# Patient Record
Sex: FEMALE | Race: WHITE | Employment: OTHER | RURAL
[De-identification: names, ages, dates, MRNs, and addresses within clinical notes are randomized per-mention and may not be internally consistent; named-entity substitution may affect disease eponyms.]

---

## 2018-04-16 ENCOUNTER — OFFICE VISIT (OUTPATIENT)
Dept: SURGERY | Age: 58
End: 2018-04-16
Payer: COMMERCIAL

## 2018-04-16 ENCOUNTER — SURG/PROC ORDERS (OUTPATIENT)
Dept: SURGERY | Age: 58
End: 2018-04-16

## 2018-04-16 VITALS
BODY MASS INDEX: 29.28 KG/M2 | HEIGHT: 64 IN | WEIGHT: 171.5 LBS | RESPIRATION RATE: 18 BRPM | TEMPERATURE: 97.8 F | DIASTOLIC BLOOD PRESSURE: 89 MMHG | HEART RATE: 62 BPM | SYSTOLIC BLOOD PRESSURE: 137 MMHG

## 2018-04-16 DIAGNOSIS — K59.00 CONSTIPATION, UNSPECIFIED CONSTIPATION TYPE: ICD-10-CM

## 2018-04-16 DIAGNOSIS — Z86.010 HISTORY OF ADENOMATOUS POLYP OF COLON: ICD-10-CM

## 2018-04-16 DIAGNOSIS — Z12.11 COLON CANCER SCREENING: Primary | ICD-10-CM

## 2018-04-16 PROCEDURE — 99244 OFF/OP CNSLTJ NEW/EST MOD 40: CPT | Performed by: SURGERY

## 2018-04-16 RX ORDER — SODIUM CHLORIDE 0.9 % (FLUSH) 0.9 %
10 SYRINGE (ML) INJECTION PRN
Status: CANCELLED | OUTPATIENT
Start: 2018-04-16

## 2018-04-16 RX ORDER — CARVEDILOL 12.5 MG/1
12.5 TABLET ORAL 2 TIMES DAILY
COMMUNITY

## 2018-04-16 RX ORDER — SODIUM CHLORIDE 0.9 % (FLUSH) 0.9 %
10 SYRINGE (ML) INJECTION EVERY 12 HOURS SCHEDULED
Status: CANCELLED | OUTPATIENT
Start: 2018-04-16

## 2018-04-16 RX ORDER — SIMVASTATIN 20 MG
20 TABLET ORAL NIGHTLY
COMMUNITY
End: 2019-06-04

## 2018-04-16 RX ORDER — SODIUM CHLORIDE, SODIUM LACTATE, POTASSIUM CHLORIDE, CALCIUM CHLORIDE 600; 310; 30; 20 MG/100ML; MG/100ML; MG/100ML; MG/100ML
INJECTION, SOLUTION INTRAVENOUS CONTINUOUS
Status: CANCELLED | OUTPATIENT
Start: 2018-04-16

## 2018-04-16 RX ORDER — HYDROCHLOROTHIAZIDE 25 MG/1
25 TABLET ORAL DAILY
COMMUNITY

## 2018-04-16 ASSESSMENT — ENCOUNTER SYMPTOMS
GASTROINTESTINAL NEGATIVE: 1
RESPIRATORY NEGATIVE: 1
EYES NEGATIVE: 1

## 2018-05-16 ENCOUNTER — HOSPITAL ENCOUNTER (OUTPATIENT)
Dept: OTHER | Age: 58
Discharge: HOME | End: 2018-05-16
Attending: SURGERY | Admitting: SURGERY

## 2018-05-16 ENCOUNTER — OFFICE VISIT (OUTPATIENT)
Dept: SURGERY | Age: 58
End: 2018-05-16
Payer: COMMERCIAL

## 2018-05-16 VITALS
HEART RATE: 65 BPM | RESPIRATION RATE: 16 BRPM | HEIGHT: 64 IN | BODY MASS INDEX: 28.53 KG/M2 | WEIGHT: 167.1 LBS | TEMPERATURE: 98.3 F | SYSTOLIC BLOOD PRESSURE: 140 MMHG | DIASTOLIC BLOOD PRESSURE: 90 MMHG

## 2018-05-16 DIAGNOSIS — D12.4 ADENOMATOUS POLYP OF DESCENDING COLON: Primary | ICD-10-CM

## 2018-05-16 DIAGNOSIS — K64.8 INTERNAL HEMORRHOIDS WITHOUT COMPLICATION: ICD-10-CM

## 2018-05-16 PROCEDURE — 99212 OFFICE O/P EST SF 10 MIN: CPT | Performed by: SURGERY

## 2019-06-04 ENCOUNTER — HOSPITAL ENCOUNTER (EMERGENCY)
Facility: HOSPITAL | Age: 59
Discharge: NURSING, REHAB OR LONG-TERM CARE | End: 2019-06-04
Attending: EMERGENCY MEDICINE
Payer: COMMERCIAL

## 2019-06-04 VITALS
HEART RATE: 67 BPM | TEMPERATURE: 98.2 F | HEIGHT: 65 IN | OXYGEN SATURATION: 98 % | RESPIRATION RATE: 20 BRPM | SYSTOLIC BLOOD PRESSURE: 129 MMHG | BODY MASS INDEX: 26.66 KG/M2 | WEIGHT: 160 LBS | DIASTOLIC BLOOD PRESSURE: 87 MMHG

## 2019-06-04 DIAGNOSIS — S65.311A LACERATION OF DEEP PALMAR ARCH OF RIGHT HAND, INITIAL ENCOUNTER: Primary | ICD-10-CM

## 2019-06-04 PROCEDURE — 12002 RPR S/N/AX/GEN/TRNK2.6-7.5CM: CPT

## 2019-06-04 PROCEDURE — 99283 EMERGENCY DEPT VISIT LOW MDM: CPT

## 2019-06-04 PROCEDURE — 90715 TDAP VACCINE 7 YRS/> IM: CPT

## 2019-06-04 PROCEDURE — 6360000002 HC RX W HCPCS

## 2019-06-04 PROCEDURE — 90471 IMMUNIZATION ADMIN: CPT

## 2019-06-04 RX ORDER — CELECOXIB 100 MG/1
100 CAPSULE ORAL 2 TIMES DAILY
COMMUNITY

## 2019-06-04 RX ORDER — BACLOFEN 10 MG/1
10 TABLET ORAL DAILY
COMMUNITY

## 2019-06-04 RX ADMIN — TETANUS TOXOID, REDUCED DIPHTHERIA TOXOID AND ACELLULAR PERTUSSIS VACCINE, ADSORBED 0.5 ML: 5; 2.5; 8; 8; 2.5 SUSPENSION INTRAMUSCULAR at 10:09

## 2019-06-04 ASSESSMENT — ENCOUNTER SYMPTOMS
DIARRHEA: 0
TROUBLE SWALLOWING: 0
SINUS PRESSURE: 0
ABDOMINAL PAIN: 0
COUGH: 0
CHEST TIGHTNESS: 0
EYE REDNESS: 0
BACK PAIN: 0
VOMITING: 0
EYE DISCHARGE: 0
WHEEZING: 0
SORE THROAT: 0
SHORTNESS OF BREATH: 0
RHINORRHEA: 0
EYE PAIN: 0
NAUSEA: 0
CONSTIPATION: 0

## 2019-06-04 ASSESSMENT — PAIN SCALES - GENERAL: PAINLEVEL_OUTOF10: 9

## 2019-06-04 ASSESSMENT — PAIN DESCRIPTION - DESCRIPTORS: DESCRIPTORS: BURNING

## 2019-06-04 ASSESSMENT — PAIN DESCRIPTION - ORIENTATION: ORIENTATION: RIGHT

## 2019-06-04 ASSESSMENT — PAIN DESCRIPTION - PAIN TYPE: TYPE: ACUTE PAIN

## 2019-06-04 ASSESSMENT — PAIN DESCRIPTION - LOCATION: LOCATION: HAND

## 2019-06-04 ASSESSMENT — PAIN DESCRIPTION - FREQUENCY: FREQUENCY: CONTINUOUS

## 2019-06-04 NOTE — ED NOTES
Patient left pov via  Rachel Loose driving at this time, instructions to be npo verbalized per patient and patient .      Saniya Alas RN  06/04/19 1353

## 2019-06-04 NOTE — ED PROVIDER NOTES
APPENDECTOMY      CHOLECYSTECTOMY      COLONOSCOPY  2011    [facility]    HYSTERECTOMY      HYSTERECTOMY, VAGINAL      NECK SURGERY  [id number]    2 ganglion cyst    WRIST GANGLION EXCISION Left          CURRENT MEDICATIONS       Previous Medications    ASPIRIN 81 MG TABLET    Take 81 mg by mouth daily    BACLOFEN (LIORESAL) 10 MG TABLET    Take 10 mg by mouth daily    CARVEDILOL (COREG) 12.5 MG TABLET    Take 12.5 mg by mouth 2 times daily    CELECOXIB (CELEBREX) 100 MG CAPSULE    Take 100 mg by mouth 2 times daily    HYDROCHLOROTHIAZIDE (HYDRODIURIL) 25 MG TABLET    Take 25 mg by mouth daily       ALLERGIES     Patient has no known allergies.     FAMILY HISTORY       Family History   Problem Relation Age of Onset    Other Mother         thyroid    Kidney Disease Mother     Coronary Art Dis Father     Heart Surgery Father     Diabetes Father     Kidney Disease Father     Coronary Art Dis Brother           SOCIAL HISTORY       Social History     Socioeconomic History    Marital status:      Spouse name: None    Number of children: None    Years of education: None    Highest education level: None   Occupational History    None   Social Needs    Financial resource strain: None    Food insecurity:     Worry: None     Inability: None    Transportation needs:     Medical: None     Non-medical: None   Tobacco Use    Smoking status: Current Every Day Smoker     Packs/day: 1.00     Years: 26.00     Pack years: 26.00     Types: Cigarettes    Smokeless tobacco: Current User   Substance and Sexual Activity    Alcohol use: Yes     Comment: occassionally    Drug use: No    Sexual activity: None   Lifestyle    Physical activity:     Days per week: None     Minutes per session: None    Stress: None   Relationships    Social connections:     Talks on phone: None     Gets together: None     Attends [employer] service: None     Active member of club or organization: None     Attends meetings of clubs or DIAGNOSIS/MDM:   Vitals:    Vitals:    06/04/19 1005   BP: 110/64   Pulse: 60   Resp: 20   Temp: 98.2 °F (36.8 °C)   TempSrc: Oral   SpO2: 98%   Weight: 160 lb (72.6 kg)   Height: 5' 5\" (1.651 m)           CRITICAL CARE TIME   Total Critical Care time was  minutes, excluding separatelyreportable procedures. There was a high probability ofclinically significant/life threatening deterioration in the patient's condition which required my urgent intervention. CONSULTS:  None    PROCEDURES:  None    PROGRESS NOTES:    Called and discussed patient with Dr Elijah Armas at [facility] and he's accepted patient for transfer. FINAL IMPRESSION      1. Laceration of deep palmar arch of right hand, initial encounter          Final diagnoses:   Laceration of deep palmar arch of right hand, initial encounter       DISPOSITION/PLAN   DISPOSITION Decision To Transfer 06/04/2019 10:05:10 AM      PATIENT REFERRED TO:  No follow-up provider specified.     DISCHARGE MEDICATIONS:  New Prescriptions    No medications on file         (Please note thatportions of this note may have been completed with a voice recognition program.  Efforts were [facility] edit the dictations but occasionally words are mis-transcribed.)    Jay Gandhi MD (electronically signed)  Attending Emergency Physician        Casper Dahl MD  06/04/19 1010

## 2019-06-04 NOTE — ED NOTES
Patients right hand removed from soak and dried at this time, then wet to dry dressing intact at this time, secured with tape.      Kiersten Garcia RN  06/04/19 1016

## 2019-06-04 NOTE — ED NOTES
Patients right hand cleaned at this time per Perla Valero rn.      Yetta Sandifer, RN  06/04/19 1012

## 2019-06-04 NOTE — ED NOTES
Pt report called to Alicja Dodd at [employer].       Van Molina RN  06/04/19 101

## 2019-06-04 NOTE — ED NOTES
Patient was holding a log, her  was cutting the log and the chain saw kicked back cutting her right hand about 45 mins ago.      Marce Melendez RN  06/04/19 3386

## 2020-07-30 ENCOUNTER — HOSPITAL ENCOUNTER (OUTPATIENT)
Dept: MRI IMAGING | Facility: HOSPITAL | Age: 60
Discharge: HOME | End: 2020-07-30
Payer: MEDICAID

## 2020-07-30 PROCEDURE — 72148 MRI LUMBAR SPINE W/O DYE: CPT

## 2020-11-13 ENCOUNTER — HOSPITAL ENCOUNTER (OUTPATIENT)
Dept: NEUROLOGY | Facility: HOSPITAL | Age: 60
Discharge: HOME | End: 2020-11-13
Payer: MEDICAID

## 2020-11-13 PROCEDURE — 95908 NRV CNDJ TST 3-4 STUDIES: CPT

## 2020-11-13 PROCEDURE — 95886 MUSC TEST DONE W/N TEST COMP: CPT

## 2021-04-14 ENCOUNTER — TELEPHONE (OUTPATIENT)
Dept: OTHER | Facility: HOSPITAL | Age: 61
End: 2021-04-14

## 2021-04-14 NOTE — TELEPHONE ENCOUNTER
Pt is on a 3 year recall list for a colonoscopy with Dr. Juan Ching. I contacted pt to inform her that Dr. Juan Ching has retired and offered to send her records to [facility] to have the procedure set up with Dr. Isaias Odom. Pt agreed to see Dr. Isaias Odom.

## 2021-04-16 ENCOUNTER — TELEPHONE (OUTPATIENT)
Dept: SURGERY | Facility: CLINIC | Age: 61
End: 2021-04-16

## 2022-03-04 ENCOUNTER — HOSPITAL ENCOUNTER (OUTPATIENT)
Dept: CT IMAGING | Facility: HOSPITAL | Age: 62
Discharge: HOME | End: 2022-03-04
Payer: MEDICAID

## 2022-03-04 DIAGNOSIS — R51.9 NONINTRACTABLE HEADACHE, UNSPECIFIED CHRONICITY PATTERN, UNSPECIFIED HEADACHE TYPE: ICD-10-CM

## 2022-03-04 PROCEDURE — 70450 CT HEAD/BRAIN W/O DYE: CPT

## 2022-06-22 ENCOUNTER — HOSPITAL ENCOUNTER (OUTPATIENT)
Dept: ULTRASOUND IMAGING | Facility: HOSPITAL | Age: 62
Discharge: HOME | End: 2022-06-22
Payer: MEDICAID

## 2022-06-22 DIAGNOSIS — N64.4 BREAST PAIN: ICD-10-CM

## 2022-06-22 PROCEDURE — 76642 ULTRASOUND BREAST LIMITED: CPT

## 2022-09-02 NOTE — PROGRESS NOTES
Patient: Erendira Cabrera    YOB: 1960    Date: 09/07/2022    Primary Care Provider: Kasie Ulloa PA    Chief Complaint   Patient presents with   • Pain     Right breast       Subjective .     History of present illness:  I saw the patient in the office  today for evaluation and treatment of pain in right breast for 5 months. She denies nipple discharge. Ultrasound was done that showed a painful palpable lesion at 9-10 o'clock.  Ultrasound today indicates no change in the very small cyst that measured 5 mm in the right breast.  No other finding.  Mammogram also unremarkable.    The following portions of the patient's history were reviewed and updated as appropriate: allergies, current medications, past family history, past medical history, past social history, past surgical history and problem list.    Review of Systems   Constitutional: Negative for chills, fever and unexpected weight change.   HENT: Negative for hearing loss, trouble swallowing and voice change.    Eyes: Negative for visual disturbance.   Respiratory: Negative for apnea, cough, chest tightness, shortness of breath and wheezing.    Cardiovascular: Negative for chest pain, palpitations and leg swelling.   Gastrointestinal: Negative for abdominal distention, abdominal pain, anal bleeding, blood in stool, constipation, diarrhea, nausea, rectal pain and vomiting.   Endocrine: Negative for cold intolerance and heat intolerance.   Genitourinary: Negative for difficulty urinating, dysuria and flank pain.   Musculoskeletal: Negative for back pain and gait problem.   Skin: Negative for color change, rash and wound.   Neurological: Negative for dizziness, syncope, speech difficulty, weakness, light-headedness, numbness and headaches.   Hematological: Negative for adenopathy. Does not bruise/bleed easily.   Psychiatric/Behavioral: Negative for confusion. The patient is not nervous/anxious.        History:  Past Medical History:   Diagnosis  "Date   • Arthritis    • Hyperlipidemia    • Hypertension           Past Surgical History:   Procedure Laterality Date   • CERVICAL FUSION  2009    ACDF - C4-5, C6-7  Dr. Zuleta @     • CHOLECYSTECTOMY     • HYSTERECTOMY         Family History   Problem Relation Age of Onset   • Cancer Mother    • Hypertension Mother    • Diabetes Father        Social History     Tobacco Use   • Smoking status: Current Every Day Smoker     Types: Cigarettes   • Smokeless tobacco: Never Used   Substance Use Topics   • Alcohol use: No   • Drug use: No       Allergies:  No Known Allergies    Medications:     Current Outpatient Medications:   •  baclofen (LIORESAL) 20 MG tablet, , Disp: , Rfl:   •  carvedilol (COREG) 12.5 MG tablet, take 1 tablet by mouth twice a day, Disp: , Rfl: 0  •  hydrochlorothiazide (HYDRODIURIL) 25 MG tablet, Take 25 mg by mouth daily., Disp: , Rfl:   •  HYDROcodone-acetaminophen (NORCO) 7.5-325 MG per tablet, , Disp: , Rfl:   •  naproxen (NAPROSYN) 500 MG tablet, take 1 tablet by mouth twice a day with food, Disp: , Rfl: 0  •  simvastatin (ZOCOR) 20 MG tablet, take 1 tablet by mouth once daily, Disp: , Rfl: 0  •  aspirin 81 MG tablet, Take 81 mg by mouth daily., Disp: , Rfl:   •  gabapentin (NEURONTIN) 600 MG tablet, Take 1 tablet by mouth 3 (three) times a day., Disp: 90 tablet, Rfl: 5    Objective     Vital Signs:   Vitals:    09/07/22 0908   BP: 132/68   Pulse: 59   Resp: 18   Temp: 98.4 °F (36.9 °C)   TempSrc: Temporal   SpO2: 98%   Weight: 77.6 kg (171 lb)   Height: 162.6 cm (64\")       Physical Exam:     General Appearance:    Alert, cooperative, in no acute distress   Head:    Normocephalic, without obvious abnormality, atraumatic   Eyes:            Lids and lashes normal, conjunctivae and sclerae normal, no   icterus, no pallor, corneas clear,   Ears:    Ears appear intact with no abnormalities noted   Throat:   No oral lesions, no thrush, oral mucosa moist   Breast:    Right breast is tender to " palpation.  No definite mass palpated   Lungs:     Clear to auscultation,respirations regular, even and                  Unlabored    Heart:    Regular rhythm and normal rate, no murmur, no gallop.   Chest Wall:    No abnormalities observed   Abdomen:     Normal bowel sounds, no masses, no organomegaly, soft        non-tender, non-distended, no guarding.   Extremities:   Moves all extremities well, no edema, no cyanosis, no             redness   Pulses:   Pulses palpable and equal bilaterally   Skin:   No bleeding, bruising or rash   Lymph nodes:   No palpable adenopathy   Neurologic:   Cranial nerves 2 - 12 grossly intact.           Results Review:   I reviewed the patient's new clinical results.    Review of Systems was reviewed and confirmed as accurate as documented by the MA.    Assessment / Plan:    1. Breast pain        I did have a detailed and extensive discussion with the patient in the office today and reviewed her recent workup.  I have told the patient that ultraindicated very small cyst is incidental.  Do not believe this is causing all of her pain.  Recommend evaluation by her family doctor to evaluate her rib cage and chest as well as right shoulder.  Continue yearly mammograms and follow-up as needed.    Electronically signed by Hossein Milner MD  09/07/22  09:47 EDT

## 2022-09-07 ENCOUNTER — OFFICE VISIT (OUTPATIENT)
Dept: SURGERY | Facility: CLINIC | Age: 62
End: 2022-09-07

## 2022-09-07 VITALS
OXYGEN SATURATION: 98 % | BODY MASS INDEX: 29.19 KG/M2 | HEIGHT: 64 IN | RESPIRATION RATE: 18 BRPM | HEART RATE: 59 BPM | SYSTOLIC BLOOD PRESSURE: 132 MMHG | DIASTOLIC BLOOD PRESSURE: 68 MMHG | WEIGHT: 171 LBS | TEMPERATURE: 98.4 F

## 2022-09-07 DIAGNOSIS — N64.4 BREAST PAIN: Primary | ICD-10-CM

## 2022-09-07 PROCEDURE — 99203 OFFICE O/P NEW LOW 30 MIN: CPT | Performed by: SURGERY

## 2022-09-07 RX ORDER — BACLOFEN 20 MG/1
TABLET ORAL
COMMUNITY
Start: 2022-09-06

## 2022-09-07 RX ORDER — HYDROCODONE BITARTRATE AND ACETAMINOPHEN 7.5; 325 MG/1; MG/1
TABLET ORAL
COMMUNITY
Start: 2022-06-29

## 2022-09-13 ENCOUNTER — TELEPHONE (OUTPATIENT)
Dept: SURGERY | Facility: CLINIC | Age: 62
End: 2022-09-13

## 2022-09-13 NOTE — TELEPHONE ENCOUNTER
Caller: ARASH LOTT/ [employer]  [phone]     What form or medical record are you requesting: PROG NOTE AND U/S FROM 09/07/22    Who is requesting this form or medical record from you: [employer]    How would you like to receive the form or medical records (pick-up, mail, fax): FAX  If fax, what is the fax number: [phone]

## 2022-10-17 ENCOUNTER — HOSPITAL ENCOUNTER (OUTPATIENT)
Dept: CT IMAGING | Facility: HOSPITAL | Age: 62
Discharge: HOME | End: 2022-10-17
Payer: MEDICAID

## 2022-10-17 DIAGNOSIS — R07.89 OTHER CHEST PAIN: ICD-10-CM

## 2022-10-17 DIAGNOSIS — R11.0 NAUSEA: ICD-10-CM

## 2022-10-17 PROCEDURE — 6360000004 HC RX CONTRAST MEDICATION: Performed by: PHYSICIAN ASSISTANT

## 2022-10-17 PROCEDURE — 71270 CT THORAX DX C-/C+: CPT

## 2022-10-17 PROCEDURE — 74170 CT ABD WO CNTRST FLWD CNTRST: CPT

## 2022-10-17 RX ADMIN — IOPAMIDOL 100 ML: 755 INJECTION, SOLUTION INTRAVENOUS at 10:07

## 2022-10-20 ENCOUNTER — TELEPHONE (OUTPATIENT)
Dept: UROLOGY | Facility: CLINIC | Age: 62
End: 2022-10-20

## 2022-10-20 NOTE — TELEPHONE ENCOUNTER
Caller: Erendira Cabrera    Relationship: Self    Best call back number: [phone]    Who are you requesting to speak with (clinical staff, provider,  specific staff member):      Do you know the name of the person who called: RABIA    What was the call regarding: SCHEDULING URGENT REFERRAL    Do you require a callback: YES

## 2022-10-24 ENCOUNTER — PATIENT ROUNDING (BHMG ONLY) (OUTPATIENT)
Dept: UROLOGY | Facility: CLINIC | Age: 62
End: 2022-10-24

## 2022-10-24 ENCOUNTER — OFFICE VISIT (OUTPATIENT)
Dept: UROLOGY | Facility: CLINIC | Age: 62
End: 2022-10-24

## 2022-10-24 VITALS
HEIGHT: 64 IN | DIASTOLIC BLOOD PRESSURE: 82 MMHG | SYSTOLIC BLOOD PRESSURE: 134 MMHG | OXYGEN SATURATION: 97 % | WEIGHT: 175 LBS | TEMPERATURE: 98.2 F | HEART RATE: 76 BPM | BODY MASS INDEX: 29.88 KG/M2

## 2022-10-24 DIAGNOSIS — N32.81 OVERACTIVE BLADDER: Primary | ICD-10-CM

## 2022-10-24 DIAGNOSIS — N28.89 RENAL MASS: ICD-10-CM

## 2022-10-24 LAB
BILIRUB BLD-MCNC: NEGATIVE MG/DL
CLARITY, POC: CLEAR
COLOR UR: YELLOW
EXPIRATION DATE: ABNORMAL
GLUCOSE UR STRIP-MCNC: NEGATIVE MG/DL
KETONES UR QL: NEGATIVE
LEUKOCYTE EST, POC: NEGATIVE
Lab: ABNORMAL
NITRITE UR-MCNC: NEGATIVE MG/ML
PH UR: 6.5 [PH] (ref 5–8)
PROT UR STRIP-MCNC: ABNORMAL MG/DL
RBC # UR STRIP: NEGATIVE /UL
SP GR UR: 1.01 (ref 1–1.03)
UROBILINOGEN UR QL: ABNORMAL

## 2022-10-24 PROCEDURE — 99203 OFFICE O/P NEW LOW 30 MIN: CPT | Performed by: UROLOGY

## 2022-10-24 NOTE — PROGRESS NOTES
Chief Complaint  Right renal mass    HPI  Ms. Cabrera is a 62 y.o. female who presents for evaluation of a right renal mass.    The patient initially presented with breast pain.    Evaluation included CT.    This showed a 1 cm right renal mass.    She saw another    Past  history is negative for UTI, kidney stones or other renal diseases.     Past Medical History  Past Medical History:   Diagnosis Date   • Arthritis    • Hyperlipidemia    • Hypertension        Past Surgical History  Past Surgical History:   Procedure Laterality Date   • CERVICAL FUSION  2009    ACDF - C4-5, C6-7  Dr. Zuleta @     • CHOLECYSTECTOMY     • HYSTERECTOMY         Medications    Current Outpatient Medications:   •  aspirin 81 MG tablet, Take 81 mg by mouth daily., Disp: , Rfl:   •  baclofen (LIORESAL) 20 MG tablet, , Disp: , Rfl:   •  carvedilol (COREG) 12.5 MG tablet, take 1 tablet by mouth twice a day, Disp: , Rfl: 0  •  gabapentin (NEURONTIN) 600 MG tablet, Take 1 tablet by mouth 3 (three) times a day., Disp: 90 tablet, Rfl: 5  •  hydrochlorothiazide (HYDRODIURIL) 25 MG tablet, Take 25 mg by mouth daily., Disp: , Rfl:   •  HYDROcodone-acetaminophen (NORCO) 7.5-325 MG per tablet, , Disp: , Rfl:   •  naproxen (NAPROSYN) 500 MG tablet, take 1 tablet by mouth twice a day with food, Disp: , Rfl: 0  •  simvastatin (ZOCOR) 20 MG tablet, take 1 tablet by mouth once daily, Disp: , Rfl: 0    Allergies  No Known Allergies    Social History  Social History     Socioeconomic History   • Marital status:    Tobacco Use   • Smoking status: Every Day     Types: Cigarettes   • Smokeless tobacco: Never   Substance and Sexual Activity   • Alcohol use: No   • Drug use: No   • Sexual activity: Defer       Family History  Family History   Problem Relation Age of Onset   • Cancer Mother    • Hypertension Mother    • Diabetes Father        Review of Systems  Constitutional: No fevers or chills  Skin: Negative for rash  Endocrine: No heat/cold intolerance  "  Cardiovascular: Negative for chest pain or dyspnea on exertion  Respiratory: Negative for shortness of breath or wheezing  Gastrointestinal: No constipation, nausea or vomiting  Genitourinary: Negative for new lower urinary tract symptoms, current gross hematuria or dysuria.  Musculoskeletal: No flank pain  Neurological:  Negative for frequent headaches or dizziness  Lymph/Heme: Negative for leg swelling or calf pain.    Physical Exam  Visit Vitals  /82   Pulse 76   Temp 98.2 °F (36.8 °C)   Ht 162.6 cm (64\")   Wt 79.4 kg (175 lb)   SpO2 97%   BMI 30.04 kg/m²     Constitutional: NAD, WDWN.   HEENT: NCAT. Conjunctivae normal.  MMM.    Cardiovascular: Regular rate.  Pulmonary/Chest: Respirations are even and non-labored bilaterally.  Abdominal: Soft. No distension, tenderness, masses or guarding. No CVA tenderness.  Neurological: A + O x 3.  Cranial Nerves II-XII grossly intact. Normal gait.  Extremities: KADI x 4, Warm. No clubbing.  No cyanosis.    Skin: Pink, warm and dry.  No rashes noted.  Psychiatric:  Normal mood and affect  Genitourinary  Nonpalpable bladder     Labs  Lab Results   Component Value Date    GLUCOSE 113 (H) 09/25/2015    CALCIUM 10.2 09/25/2015     09/25/2015    K 3.9 09/25/2015    CO2 27 09/25/2015     09/25/2015    BUN 23 (H) 09/25/2015    CREATININE 0.9 09/25/2015    ANIONGAP 18 09/25/2015       Radiographic Studies  CT a/p with contrast 10/17/2022 outside hospital  Findings include the following:  Enhancing right renal mass 11mm. Right posterior, minimally exophytic    I reviewed her labs and imaging.    Assessment  Ms. Cabrera is a 62 y.o. female who presents with a small right renal mass incidentally discovered due to CT for breast pain.      We discussed that this enhancing renal lesion likely suggests a malignancy, and the risk of renal cell carcinoma is approximately 75-80% and thus a 20-25% chance this is a benign mass. May be difficult to remove at this point being so " small, but she is of course concerned.     Plan  1.  Referral to Dr. Ireland at       Elmo Silva MD

## 2022-10-28 NOTE — PROGRESS NOTES
October 28, 2022    Hello, may I speak with Erendira Cabrera? LVM    My name is Shama.    I am  with [facility] [place]  [address]  [address]  [facility] [address] [phone].    Before we get started may I verify your date of birth? 1960    I am calling to officially welcome you to our practice and ask about your recent visit. Is this a good time to talk?     Tell me about your visit with us. What things went well?        We're always looking for ways to make our patients' experiences even better. Do you have recommendations on ways we may improve?     Overall were you satisfied with your first visit to our practice?       I appreciate you taking the time to speak with me today. Is there anything else I can do for you?       Thank you, and have a great day.

## 2023-01-31 ENCOUNTER — HOSPITAL ENCOUNTER (OUTPATIENT)
Dept: CT IMAGING | Facility: HOSPITAL | Age: 63
Discharge: HOME | End: 2023-01-31
Payer: MEDICAID

## 2023-01-31 DIAGNOSIS — N28.89 RENAL MASS, RIGHT: ICD-10-CM

## 2023-01-31 LAB
BUN BLDV-MCNC: 17 MG/DL (ref 6–20)
CREAT SERPL-MCNC: 0.9 MG/DL (ref 0.4–1.2)
GFR SERPL CREATININE-BSD FRML MDRD: >60 ML/MIN/{1.73_M2}

## 2023-01-31 PROCEDURE — 74170 CT ABD WO CNTRST FLWD CNTRST: CPT

## 2023-01-31 PROCEDURE — 6360000004 HC RX CONTRAST MEDICATION: Performed by: PHYSICIAN ASSISTANT

## 2023-01-31 PROCEDURE — 82565 ASSAY OF CREATININE: CPT

## 2023-01-31 PROCEDURE — 36415 COLL VENOUS BLD VENIPUNCTURE: CPT

## 2023-01-31 PROCEDURE — 84520 ASSAY OF UREA NITROGEN: CPT

## 2023-01-31 RX ADMIN — IOPAMIDOL 100 ML: 755 INJECTION, SOLUTION INTRAVENOUS at 10:36

## 2023-03-24 ENCOUNTER — TELEPHONE (OUTPATIENT)
Dept: SURGERY | Facility: CLINIC | Age: 63
End: 2023-03-24
Payer: MEDICAID

## 2023-03-27 ENCOUNTER — PREP FOR SURGERY (OUTPATIENT)
Dept: OTHER | Facility: HOSPITAL | Age: 63
End: 2023-03-27
Payer: MEDICAID

## 2023-03-27 DIAGNOSIS — Z86.010 HISTORY OF COLON POLYPS: Primary | ICD-10-CM

## 2023-03-27 RX ORDER — BISACODYL 5 MG
TABLET, DELAYED RELEASE (ENTERIC COATED) ORAL
Qty: 4 TABLET | Refills: 0 | Status: SHIPPED | OUTPATIENT
Start: 2023-03-27

## 2023-03-27 RX ORDER — POLYETHYLENE GLYCOL 3350 17 G/17G
POWDER, FOR SOLUTION ORAL
Qty: 238 G | Refills: 0 | Status: SHIPPED | OUTPATIENT
Start: 2023-03-27

## 2023-03-27 NOTE — TELEPHONE ENCOUNTER
PRESCREENING FOR OPEN ACCESS SCHEDULING    Erendira Cabrera, 1960  [id number]    03/27/23    If, the patient answers yes to any of the following questions the provider will be informed prior to scheduling open access for approval and documented in the chart.    [x]  Yes  [] No    1. Have you ever had a colonoscopy in the past?      When:  5yrs      Where:       Polyps or other:     []  Yes  [x] No    2. Family history of colon cancer?      Relation:       Age of onset:       Do you currently have any of the following?    []  Yes  [x] No  Rectal bleeding, if so, how long?     []  Yes  [x] No  Abdominal pain, if so, how long?    [x]  Yes  [] No  Constipation, if so, how long?    []  Yes  [x] No  Diarrhea, if so, how long?    []  Yes  [x] No  Weight loss, is so, how much?    [] Yes  [x] No  Small caliber stool, if so, how long?      Have you ever had any of the following conditions?    [] Yes  [x] No  Heart attack?      When?       Last cardiac workup?     Blood thinners?    [] Yes  [x] No   Lung problems, asthma or COPD?  [] Yes  [x] No  Oxygen required?       [] Yes  [x] No  Stroke?     [] Yes  [x] No  Have you ever had a reaction to anesthesia?

## 2023-03-27 NOTE — TELEPHONE ENCOUNTER
Pt is scheduled at [facility] for colonoscopy on 4/13/23, instructions mailed, prep sent in.

## 2023-04-10 ENCOUNTER — TELEPHONE (OUTPATIENT)
Dept: SURGERY | Facility: CLINIC | Age: 63
End: 2023-04-10
Payer: MEDICAID

## 2023-04-13 ENCOUNTER — OUTSIDE FACILITY SERVICE (OUTPATIENT)
Dept: SURGERY | Facility: CLINIC | Age: 63
End: 2023-04-13
Payer: MEDICAID

## 2023-04-13 ENCOUNTER — HOSPITAL ENCOUNTER (OUTPATIENT)
Facility: HOSPITAL | Age: 63
Setting detail: OUTPATIENT SURGERY
Discharge: HOME | End: 2023-04-13
Attending: SURGERY | Admitting: SURGERY
Payer: MEDICAID

## 2023-04-13 VITALS
WEIGHT: 162 LBS | HEART RATE: 75 BPM | OXYGEN SATURATION: 100 % | SYSTOLIC BLOOD PRESSURE: 119 MMHG | RESPIRATION RATE: 20 BRPM | DIASTOLIC BLOOD PRESSURE: 77 MMHG | HEIGHT: 64 IN | TEMPERATURE: 98 F | BODY MASS INDEX: 27.66 KG/M2

## 2023-04-13 PROCEDURE — 2580000003 HC RX 258: Performed by: SURGERY

## 2023-04-13 PROCEDURE — 3609010400 HC COLONOSCOPY POLYPECTOMY HOT BIOPSY: Performed by: SURGERY

## 2023-04-13 PROCEDURE — 7100000011 HC PHASE II RECOVERY - ADDTL 15 MIN: Performed by: SURGERY

## 2023-04-13 PROCEDURE — 2709999900 HC NON-CHARGEABLE SUPPLY: Performed by: SURGERY

## 2023-04-13 PROCEDURE — 45384 COLONOSCOPY W/LESION REMOVAL: CPT | Performed by: SURGERY

## 2023-04-13 PROCEDURE — 3700000000 HC ANESTHESIA ATTENDED CARE: Performed by: SURGERY

## 2023-04-13 PROCEDURE — 7100000010 HC PHASE II RECOVERY - FIRST 15 MIN: Performed by: SURGERY

## 2023-04-13 PROCEDURE — 3700000001 HC ADD 15 MINUTES (ANESTHESIA): Performed by: SURGERY

## 2023-04-13 RX ORDER — M-VIT,TX,IRON,MINS/CALC/FOLIC 27MG-0.4MG
1 TABLET ORAL DAILY
COMMUNITY

## 2023-04-13 RX ORDER — OXYCODONE AND ACETAMINOPHEN 7.5; 325 MG/1; MG/1
1 TABLET ORAL EVERY 8 HOURS PRN
COMMUNITY

## 2023-04-13 RX ORDER — AMLODIPINE BESYLATE 5 MG/1
5 TABLET ORAL DAILY
COMMUNITY

## 2023-04-13 RX ORDER — SODIUM CHLORIDE, SODIUM LACTATE, POTASSIUM CHLORIDE, CALCIUM CHLORIDE 600; 310; 30; 20 MG/100ML; MG/100ML; MG/100ML; MG/100ML
INJECTION, SOLUTION INTRAVENOUS CONTINUOUS
Status: DISCONTINUED | OUTPATIENT
Start: 2023-04-13 | End: 2023-04-13 | Stop reason: HOSPADM

## 2023-04-13 RX ORDER — GABAPENTIN 300 MG/1
300 CAPSULE ORAL 2 TIMES DAILY
COMMUNITY

## 2023-04-13 RX ADMIN — SODIUM CHLORIDE, POTASSIUM CHLORIDE, SODIUM LACTATE AND CALCIUM CHLORIDE: 600; 310; 30; 20 INJECTION, SOLUTION INTRAVENOUS at 10:22

## 2023-04-13 NOTE — PROGRESS NOTES
Pt arrives ambulatory. No complaints noted. Verifies he is here for colonoscopy with Dr. Angie House. States prep was effective. Verbalizes understanding of procedure. Consent on chart. Verbalizes understanding of d/c instructions. HR regular. Lungs clear. +BS. Denies chest pain or SOA. States she is here for a possible diverticulitis. States  will drive her home post-procedure. Side rails up x 2.

## 2023-04-13 NOTE — OP NOTE
PATIENT:    Liya Suero    DATE OF SURGERY:  04/13/23    PHYSICIAN:    Tristian Persaud MD, MD, FACS    REFERRING PHYSICIAN:  Shiva Hilario PA-C      YOB: 1960    PREOPERATIVE DIAGNOSIS:   History of colon polyps    POSTOPERATIVE DIAGNOSIS: Transverse colon polyp, diverticulosis, internal hemorrhoids      Estimated blood loss: None    PROCEDURE:  Colonoscopy with polypectomy via hot forcep    HISTORY:   The patient was sent to me as a consultation via Shiva Hilario PA-C for evaluation and treatment of the above-mentioned symptomatology. The patient is here now today for elective colonoscopy. I did meet with the patient preoperatively who understands the full risks and benefits of the above-mentioned procedure. We will proceed with this today on an elective basis. ANESTHESIA:  The patient was monitored both preoperatively and postoperatively in the normal fashion from a cardiovascular standpoint. Oxygen was delivered at 2 liters per nasal cannula, and oxygen saturations were monitored during the procedure. The patient remained stable from a cardiovascular standpoint throughout the entire procedure. OPERATIVE PROCEDURE:  The patient was taken to the endoscopy unit and placed in the supine position and given anesthesia as mentioned above. The patient was then placed in the left lateral decubitus position and the scope was introduced into the patients anus and then into the rectum without difficulty. The scope was carefully advanced throughout the colon to the ileocecal valve. All mucosal surfaces were visualized. Upon careful withdrawal of the scope, there was noted to be a 5 mm polyp in the transverse colon, removed with hot forcep and retrieved. Patient also had multiple scattered diverticuli in the left colon and sigmoid colon without inflammation or bleeding.   Moderate size internal hemorrhoids, grade 2..      A retroflexed view was obtained of the patients rectum

## 2023-09-07 ENCOUNTER — HOSPITAL ENCOUNTER (OUTPATIENT)
Dept: CT IMAGING | Facility: HOSPITAL | Age: 63
Discharge: HOME | End: 2023-09-07
Payer: MEDICAID

## 2023-09-07 DIAGNOSIS — N28.9 URETERAL SLUDGE: ICD-10-CM

## 2023-09-07 LAB
BUN SERPL-MCNC: 16 MG/DL (ref 6–20)
CREAT SERPL-MCNC: 1 MG/DL (ref 0.4–1.2)
GFR SERPLBLD CREATININE-BSD FMLA CKD-EPI: >60 ML/MIN/{1.73_M2}

## 2023-09-07 PROCEDURE — 6360000004 HC RX CONTRAST MEDICATION: Performed by: UROLOGY

## 2023-09-07 PROCEDURE — 36415 COLL VENOUS BLD VENIPUNCTURE: CPT

## 2023-09-07 PROCEDURE — 84520 ASSAY OF UREA NITROGEN: CPT

## 2023-09-07 PROCEDURE — 74178 CT ABD&PLV WO CNTR FLWD CNTR: CPT

## 2023-09-07 PROCEDURE — 82565 ASSAY OF CREATININE: CPT

## 2023-09-07 RX ADMIN — IOPAMIDOL 100 ML: 755 INJECTION, SOLUTION INTRAVENOUS at 11:56

## 2023-10-31 ENCOUNTER — HOSPITAL ENCOUNTER (OUTPATIENT)
Dept: MRI IMAGING | Facility: HOSPITAL | Age: 63
Discharge: HOME | End: 2023-10-31
Payer: MEDICAID

## 2023-10-31 DIAGNOSIS — N28.89 RENAL MASS: ICD-10-CM

## 2023-10-31 LAB
BUN SERPL-MCNC: 17 MG/DL (ref 6–20)
CREAT SERPL-MCNC: 1 MG/DL (ref 0.4–1.2)
GFR SERPLBLD CREATININE-BSD FMLA CKD-EPI: >60 ML/MIN/{1.73_M2}

## 2023-10-31 PROCEDURE — 6360000004 HC RX CONTRAST MEDICATION: Performed by: PHYSICIAN ASSISTANT

## 2023-10-31 PROCEDURE — 74183 MRI ABD W/O CNTR FLWD CNTR: CPT

## 2023-10-31 PROCEDURE — 82565 ASSAY OF CREATININE: CPT

## 2023-10-31 PROCEDURE — A9579 GAD-BASE MR CONTRAST NOS,1ML: HCPCS | Performed by: PHYSICIAN ASSISTANT

## 2023-10-31 PROCEDURE — 36415 COLL VENOUS BLD VENIPUNCTURE: CPT

## 2023-10-31 PROCEDURE — 84520 ASSAY OF UREA NITROGEN: CPT

## 2023-10-31 RX ADMIN — GADOTERIDOL 15 ML: 279.3 INJECTION, SOLUTION INTRAVENOUS at 12:11

## 2025-04-07 ENCOUNTER — HOSPITAL ENCOUNTER (OUTPATIENT)
Dept: ULTRASOUND IMAGING | Facility: HOSPITAL | Age: 65
Discharge: HOME | End: 2025-04-07
Payer: MEDICAID

## 2025-04-07 ENCOUNTER — HOSPITAL ENCOUNTER (OUTPATIENT)
Dept: MRI IMAGING | Facility: HOSPITAL | Age: 65
Discharge: HOME | End: 2025-04-07
Payer: MEDICAID

## 2025-04-07 DIAGNOSIS — R20.0 LEFT SIDED NUMBNESS: ICD-10-CM

## 2025-04-07 PROCEDURE — 93971 EXTREMITY STUDY: CPT

## 2025-04-07 PROCEDURE — 70551 MRI BRAIN STEM W/O DYE: CPT

## 2025-08-12 ENCOUNTER — TRANSCRIBE ORDERS (OUTPATIENT)
Dept: ADMINISTRATIVE | Age: 65
End: 2025-08-12

## 2025-08-12 DIAGNOSIS — N64.9 DISORDER OF BREAST, UNSPECIFIED: Primary | ICD-10-CM

## 2025-08-18 ENCOUNTER — TRANSCRIBE ORDERS (OUTPATIENT)
Dept: GENERAL RADIOLOGY | Facility: HOSPITAL | Age: 65
End: 2025-08-18

## 2025-08-18 DIAGNOSIS — N64.9 DISORDER OF BREAST: Primary | ICD-10-CM

## 2025-08-19 ENCOUNTER — HOSPITAL ENCOUNTER (OUTPATIENT)
Dept: MAMMOGRAPHY | Facility: HOSPITAL | Age: 65
Discharge: HOME | End: 2025-08-19
Payer: MEDICAID

## 2025-08-19 ENCOUNTER — HOSPITAL ENCOUNTER (OUTPATIENT)
Dept: ULTRASOUND IMAGING | Facility: HOSPITAL | Age: 65
Discharge: HOME | End: 2025-08-19
Payer: MEDICAID

## 2025-08-19 VITALS — BODY MASS INDEX: 26.95 KG/M2 | WEIGHT: 157 LBS

## 2025-08-19 DIAGNOSIS — N64.9 DISORDER OF BREAST: ICD-10-CM

## 2025-08-19 DIAGNOSIS — N64.9 DISORDER OF BREAST, UNSPECIFIED: ICD-10-CM

## 2025-08-19 PROCEDURE — G0279 TOMOSYNTHESIS, MAMMO: HCPCS

## 2025-08-19 PROCEDURE — 76642 ULTRASOUND BREAST LIMITED: CPT

## (undated) DEVICE — DISPOSABLE HOT BIOPSY FORCEPS: Brand: DISPOSABLE HOT BIOPSY FORCEPS